# Patient Record
Sex: FEMALE | Race: BLACK OR AFRICAN AMERICAN | NOT HISPANIC OR LATINO | URBAN - METROPOLITAN AREA
[De-identification: names, ages, dates, MRNs, and addresses within clinical notes are randomized per-mention and may not be internally consistent; named-entity substitution may affect disease eponyms.]

---

## 2021-08-25 ENCOUNTER — EMERGENCY (EMERGENCY)
Facility: HOSPITAL | Age: 21
LOS: 1 days | Discharge: HOME | End: 2021-08-25
Attending: STUDENT IN AN ORGANIZED HEALTH CARE EDUCATION/TRAINING PROGRAM
Payer: COMMERCIAL

## 2021-08-25 VITALS
TEMPERATURE: 98 F | OXYGEN SATURATION: 96 % | WEIGHT: 205.03 LBS | HEIGHT: 65 IN | DIASTOLIC BLOOD PRESSURE: 82 MMHG | RESPIRATION RATE: 16 BRPM | HEART RATE: 73 BPM | SYSTOLIC BLOOD PRESSURE: 117 MMHG

## 2021-08-25 PROCEDURE — 99283 EMERGENCY DEPT VISIT LOW MDM: CPT

## 2021-08-25 RX ORDER — AZITHROMYCIN 500 MG/1
1 TABLET, FILM COATED ORAL
Qty: 3 | Refills: 0
Start: 2021-08-25 | End: 2021-08-27

## 2021-08-25 NOTE — ED ADULT TRIAGE NOTE - CHIEF COMPLAINT QUOTE
Patient ambulated into ED with steady gait, Pt c/o having diarrhea since returning from [place], and having abd pain

## 2021-08-25 NOTE — ED PROVIDER NOTE - ATTENDING CONTRIBUTION TO CARE
20 y/o f, denies PMH, presents to ED c/o diarrhea. Mostly after eating. Started 5 days ago after travel to [place]. Friend with similar symptoms. Denies fever, chills, vomiting, cp, sob, abd pain. dysuria, BRBPR.   AP - appears well hydrated, abd soft nontender. supportive care for possible travelers diarrhea. strict return precautions discussed

## 2021-08-25 NOTE — ED PROVIDER NOTE - CLINICAL SUMMARY MEDICAL DECISION MAKING FREE TEXT BOX
Pt with loose bowel movements for 5 days after returning from the [place]. Afebrile since onset of symptoms. Abd exam benign. Advised to increase liquid intake, azithromycin sent to pharmacy and advised pt to take if symptoms are persistent. Advised to return to ED for any new or worsening symptoms.

## 2021-08-25 NOTE — ED PROVIDER NOTE - OBJECTIVE STATEMENT
Pt Pt is a 22 y/o f, denies PMH, presents to ED c/o diarrhea. Pt reports 5 days of diarrhea after returning from travel to [place]. Pt reports "loose bowl movements" after eating meals since her return. Pt states she traveled with a friend who is also experiencing similar symptoms. Pt has been afebrile since onset of symptoms. Pt has had no episodes of vomiting. No other complaints at this time. Denies fevers, chills, SOB, chest pain, back pain, nausea, vomiting, HA, dizziness.

## 2021-08-25 NOTE — ED PROVIDER NOTE - PATIENT PORTAL LINK FT
You can access the FollowMyHealth Patient Portal offered by [facility] by registering at the following website: [email or web address]. By joining [employer]’s FollowMyHealth portal, you will also be able to view your health information using other applications (apps) compatible with our system.

## 2024-05-20 ENCOUNTER — EMERGENCY (EMERGENCY)
Facility: HOSPITAL | Age: 24
LOS: 1 days | Discharge: HOME | End: 2024-05-20
Attending: EMERGENCY MEDICINE
Payer: COMMERCIAL

## 2024-05-20 VITALS
TEMPERATURE: 99 F | OXYGEN SATURATION: 100 % | DIASTOLIC BLOOD PRESSURE: 82 MMHG | HEART RATE: 67 BPM | SYSTOLIC BLOOD PRESSURE: 121 MMHG | RESPIRATION RATE: 16 BRPM

## 2024-05-20 LAB
ALBUMIN SERPL ELPH-MCNC: 4.2 G/DL — SIGNIFICANT CHANGE UP (ref 3.3–5.2)
ALP SERPL-CCNC: 66 U/L — SIGNIFICANT CHANGE UP (ref 40–120)
ALT FLD-CCNC: 20 U/L — SIGNIFICANT CHANGE UP
ANION GAP SERPL CALC-SCNC: 16 MMOL/L — SIGNIFICANT CHANGE UP (ref 5–17)
AST SERPL-CCNC: 37 U/L — HIGH
BASOPHILS # BLD AUTO: 0.04 K/UL — SIGNIFICANT CHANGE UP (ref 0–0.2)
BASOPHILS NFR BLD AUTO: 0.7 % — SIGNIFICANT CHANGE UP (ref 0–2)
BILIRUB SERPL-MCNC: 0.3 MG/DL — LOW (ref 0.4–2)
BUN SERPL-MCNC: 10.7 MG/DL — SIGNIFICANT CHANGE UP (ref 8–20)
CALCIUM SERPL-MCNC: 9.3 MG/DL — SIGNIFICANT CHANGE UP (ref 8.4–10.5)
CHLORIDE SERPL-SCNC: 101 MMOL/L — SIGNIFICANT CHANGE UP (ref 96–108)
CK MB CFR SERPL CALC: 8.9 NG/ML — HIGH (ref 0–6.7)
CK SERPL-CCNC: 666 U/L — HIGH (ref 25–170)
CO2 SERPL-SCNC: 22 MMOL/L — SIGNIFICANT CHANGE UP (ref 22–29)
CREAT SERPL-MCNC: 0.79 MG/DL — SIGNIFICANT CHANGE UP (ref 0.5–1.3)
EGFR: 106 ML/MIN/1.73M2 — SIGNIFICANT CHANGE UP
EOSINOPHIL # BLD AUTO: 0.02 K/UL — SIGNIFICANT CHANGE UP (ref 0–0.5)
EOSINOPHIL NFR BLD AUTO: 0.3 % — SIGNIFICANT CHANGE UP (ref 0–6)
GLUCOSE SERPL-MCNC: 106 MG/DL — HIGH (ref 70–99)
HCG SERPL-ACNC: <4 MIU/ML — SIGNIFICANT CHANGE UP
HCT VFR BLD CALC: 39.5 % — SIGNIFICANT CHANGE UP (ref 34.5–45)
HGB BLD-MCNC: 12.7 G/DL — SIGNIFICANT CHANGE UP (ref 11.5–15.5)
IMM GRANULOCYTES NFR BLD AUTO: 0.2 % — SIGNIFICANT CHANGE UP (ref 0–0.9)
LYMPHOCYTES # BLD AUTO: 1.89 K/UL — SIGNIFICANT CHANGE UP (ref 1–3.3)
LYMPHOCYTES # BLD AUTO: 32.1 % — SIGNIFICANT CHANGE UP (ref 13–44)
MAGNESIUM SERPL-MCNC: 1.7 MG/DL — SIGNIFICANT CHANGE UP (ref 1.6–2.6)
MCHC RBC-ENTMCNC: 26.5 PG — LOW (ref 27–34)
MCHC RBC-ENTMCNC: 32.2 GM/DL — SIGNIFICANT CHANGE UP (ref 32–36)
MCV RBC AUTO: 82.5 FL — SIGNIFICANT CHANGE UP (ref 80–100)
MONOCYTES # BLD AUTO: 0.41 K/UL — SIGNIFICANT CHANGE UP (ref 0–0.9)
MONOCYTES NFR BLD AUTO: 7 % — SIGNIFICANT CHANGE UP (ref 2–14)
NEUTROPHILS # BLD AUTO: 3.51 K/UL — SIGNIFICANT CHANGE UP (ref 1.8–7.4)
NEUTROPHILS NFR BLD AUTO: 59.7 % — SIGNIFICANT CHANGE UP (ref 43–77)
PLATELET # BLD AUTO: 293 K/UL — SIGNIFICANT CHANGE UP (ref 150–400)
POTASSIUM SERPL-MCNC: 4.3 MMOL/L — SIGNIFICANT CHANGE UP (ref 3.5–5.3)
POTASSIUM SERPL-SCNC: 4.3 MMOL/L — SIGNIFICANT CHANGE UP (ref 3.5–5.3)
PROT SERPL-MCNC: 8.1 G/DL — SIGNIFICANT CHANGE UP (ref 6.6–8.7)
RBC # BLD: 4.79 M/UL — SIGNIFICANT CHANGE UP (ref 3.8–5.2)
RBC # FLD: 14.1 % — SIGNIFICANT CHANGE UP (ref 10.3–14.5)
SODIUM SERPL-SCNC: 139 MMOL/L — SIGNIFICANT CHANGE UP (ref 135–145)
WBC # BLD: 5.88 K/UL — SIGNIFICANT CHANGE UP (ref 3.8–10.5)
WBC # FLD AUTO: 5.88 K/UL — SIGNIFICANT CHANGE UP (ref 3.8–10.5)

## 2024-05-20 PROCEDURE — 82553 CREATINE MB FRACTION: CPT

## 2024-05-20 PROCEDURE — 80053 COMPREHEN METABOLIC PANEL: CPT

## 2024-05-20 PROCEDURE — 99284 EMERGENCY DEPT VISIT MOD MDM: CPT | Mod: 25

## 2024-05-20 PROCEDURE — 93010 ELECTROCARDIOGRAM REPORT: CPT

## 2024-05-20 PROCEDURE — 36415 COLL VENOUS BLD VENIPUNCTURE: CPT

## 2024-05-20 PROCEDURE — 83735 ASSAY OF MAGNESIUM: CPT

## 2024-05-20 PROCEDURE — 82550 ASSAY OF CK (CPK): CPT

## 2024-05-20 PROCEDURE — 99284 EMERGENCY DEPT VISIT MOD MDM: CPT

## 2024-05-20 PROCEDURE — 85025 COMPLETE CBC W/AUTO DIFF WBC: CPT

## 2024-05-20 PROCEDURE — 84702 CHORIONIC GONADOTROPIN TEST: CPT

## 2024-05-20 PROCEDURE — 93005 ELECTROCARDIOGRAM TRACING: CPT

## 2024-05-20 RX ORDER — SODIUM CHLORIDE 9 MG/ML
1000 INJECTION INTRAMUSCULAR; INTRAVENOUS; SUBCUTANEOUS ONCE
Refills: 0 | Status: COMPLETED | OUTPATIENT
Start: 2024-05-20 | End: 2024-05-20

## 2024-05-20 RX ORDER — ACETAMINOPHEN 500 MG
1000 TABLET ORAL ONCE
Refills: 0 | Status: DISCONTINUED | OUTPATIENT
Start: 2024-05-20 | End: 2024-05-28

## 2024-05-20 RX ADMIN — SODIUM CHLORIDE 1000 MILLILITER(S): 9 INJECTION INTRAMUSCULAR; INTRAVENOUS; SUBCUTANEOUS at 22:51

## 2024-05-20 RX ADMIN — SODIUM CHLORIDE 1000 MILLILITER(S): 9 INJECTION INTRAMUSCULAR; INTRAVENOUS; SUBCUTANEOUS at 19:33

## 2024-05-20 NOTE — ED PROVIDER NOTE - NSFOLLOWUPINSTRUCTIONS_ED_ALL_ED_FT
Rhabdomyolysis  Rhabdomyolysis is a condition that happens when muscle cells break down and release substances into the blood that can damage the kidneys. This condition happens because of damage to the muscles that move bones (skeletal muscles). When the skeletal muscles are damaged, substances inside the muscle cells go into the blood. One of these substances is a protein called myoglobin.    Large amounts of myoglobin can cause kidney damage or kidney failure. Other substances that are released by muscle cells may upset the balance of the minerals (electrolytes) in the blood. This imbalance causes the blood to have too much acid (acidosis).    What are the causes?  This condition is caused by muscle damage. There are common causes and possible causes.    Common causes of the condition    Common causes of muscle damage include:  Using the muscles too much.  Getting an injury that crushes or squeezes a muscle too tightly.  Using drugs, mainly cocaine.  Drinking too much alcohol.  Possible causes of the condition    Other possible causes of this condition include:  Some prescription medicines, such as:  Medicines that lower cholesterol (statins).  Amphetamines, which are used to treat attention deficit hyperactivity disorder (ADHD) or to help with weight loss.  Certain pain medicines (opiates).  Infections.  Muscle diseases that are passed down from parent to child (inherited).  High fever.  Heatstroke.  Dehydration.  Seizures.  Surgery.  What increases the risk?  The following factors may make you more likely to develop this condition:  A family history of muscle disease.  Taking part in extreme sports, such as running in marathons.  Diabetes.  Being an older adult.  Heavy drug or alcohol use.  What are the signs or symptoms?  A person can have the symptoms of rhabdomyolysis or complications that come from the symptoms.    Symptoms of the disease    Symptoms of this condition vary. Some people have very few symptoms and other people have many symptoms.    Common symptoms include:  Muscle pain and swelling.  Weak muscles.  Dark urine.  Feeling weak and tired.  Other symptoms include:  Nausea and vomiting.  A fever.  Pain in the abdomen.  Pain in the joints.  Complications from the disease    Complications from this condition include:  A heart rhythm that is not normal (arrhythmia).  Seizures.  Not urinating enough because of kidney failure.  Very low blood pressure (hypotension) and shock. Signs of shock include dizziness, blurry vision, and clammy skin.  Bleeding that is hard to stop or control.  Compartment syndrome. This is when there is too much pressure in the space surrounding muscles.  How is this diagnosed?  This condition is diagnosed based on:  Your symptoms and medical history.  A physical exam.  Tests, including:  Blood tests.  Urine tests to check for myoglobin.  You may also have other tests to check for causes of muscle damage and to check for complications.    How is this treated?    This condition may be treated with:  Fluids and medicines given through an IV that is inserted into one of your veins.  Medicines to lower acidosis or to restore the balance of electrolytes in your blood.  Hemodialysis. This treatment uses an artificial kidney machine to filter your blood while you recover. You may have this if other treatments are not helping.  Follow these instructions at home:  Activity    Rest at home as told by your health care provider.  Return to your normal activities as told by your health care provider. Ask your health care provider what activities are safe for you.  Do not do activities that take a lot of effort. Ask your health care provider what level of exercise is safe for you.  Alcohol use    Do not drink alcohol if:  Your health care provider tells you not to drink.  You are pregnant, may be pregnant, or are planning to become pregnant.  If you drink alcohol:  Limit how much you use to:  0–1 drink a day for women.  0–2 drinks a day for men.  Be aware of how much alcohol is in your drink. In the [place] one drink equals one 12 oz bottle of beer (355 mL), one 5 oz glass of wine (148 mL), or one 1½ oz glass of hard liquor (44 mL).  General instructions      Take over-the-counter and prescription medicines only as told by your health care provider.  Drink enough fluid to keep your urine pale yellow.  Do not use drugs.  If you are having problems with drug or alcohol use, ask your health care provider for help.  Keep all follow-up visits as told by your health care provider. This is important.  Contact a health care provider if:  You start having symptoms of this condition after treatment.  Get help right away if you:  Have a seizure.  Bleed easily or cannot control bleeding.  Cannot urinate.  Have chest pain.  Have trouble breathing.  These symptoms may represent a serious problem that is an emergency. Do not wait to see if the symptoms will go away. Get medical help right away. Call your local emergency services (911 in the [place] Do not drive yourself to the hospital.    Summary  Rhabdomyolysis is a condition that happens when muscle cells break down and release substances into the blood that can damage the kidneys.  This condition happens because of damage to the muscles that move bones (skeletal muscle). It can also be caused by too much alcohol and drug use.  Treatment may include fluids, medicines, and helping the kidneys filter blood (hemodialysis).  Contact your health care provider if you start having symptoms of this condition after treatment.  Get help right away if you have a seizure, chest pain, or trouble breathing, or if you bleed easily, cannot control bleeding, or cannot urinate.  This information is not intended to replace advice given to you by your health care provider. Make sure you discuss any questions you have with your health care provider.

## 2024-05-20 NOTE — ED PROVIDER NOTE - OBJECTIVE STATEMENT
Patient is a 25 yo female with no PMHx presenting with syncope. Patient and mother at bedside states patient was lifting weights today when her vision went dark and patient passed out. Denies headstrike, blood thinners. Patient has been working out a lot more than usual and has had paresthesias to bilateral LE. Patient endorsing a dull HA currently. Denies any PMHx, allergies, surgeries, smoking, drug use. Patient aaox3, no FND, moving all extremities equally. No signs of trauma.

## 2024-05-20 NOTE — ED PROVIDER NOTE - PROGRESS NOTE DETAILS
JK - labs showing mild rhabdo; vss resting comfortably well appearing. Ambulating on her own tolerating PO. Ready for DC.

## 2024-05-20 NOTE — ED ADULT NURSE NOTE - OBJECTIVE STATEMENT
pt AOx4, breathing even and unlabored. assumed care 1846. pt arrives to ED w/mom c/o lightheadedness, blurry vision, elevated BP today. pt states symptoms started while lifting weights at the gym. pt states she's been on fluid cleanse fasting last 2 days. denies LOC. pending lab results.

## 2024-05-20 NOTE — ED PROVIDER NOTE - PATIENT PORTAL LINK FT
You can access the FollowMyHealth Patient Portal offered by [facility] by registering at the following website: [email or web address]. By joining [employer]’s FollowMyHealth portal, you will also be able to view your health information using other applications (apps) compatible with our system.

## 2024-05-20 NOTE — ED PROVIDER NOTE - CLINICAL SUMMARY MEDICAL DECISION MAKING FREE TEXT BOX
Patient is a 25 yo female with no PMHx presenting with syncope. VSS, well appearing, PERRLA, EOMI, CN 2-12 intact, no FND.    Will check labs r.o electrolyte abnormalities and rhabdo, treat likely tension HA, ekg to r.o arrythmia, hydrate, reassess.

## 2024-05-20 NOTE — ED PROVIDER NOTE - ATTENDING CONTRIBUTION TO CARE
Patient is a 25 yo female with no PMHx presenting with syncope. Patient and mother at bedside states patient was lifting weights today when her vision went dark and patient passed out. Denies headstrike, blood thinners. Patient has been working out a lot more than usual and has had paresthesias to bilateral LE. Patient endorsing a dull HA currently. Denies any PMHx, allergies, surgeries, smoking, drug use. Patient aaox3, no FND, moving all extremities equally. No signs of trauma.    In ED patient feeling back to baseline.  Normal neurologic examination.  Pulses equal bilaterally.  Will obtain screening labs, hCG, CTA given weight lifting, EKG, feet and reevaluate.

## 2024-05-20 NOTE — ED ADULT TRIAGE NOTE - GLASGOW COMA SCALE: BEST VERBAL RESPONSE, MLM
11/04/19 1400   Group 4   Start Time 1400   Stop Time 1440   Length (min) 40 min   Group Name Therapeutic Activity   Focus of Group Art work project   Attendance Present   Mood Bright   Affect Calm   Behavior/Socialization Engaged   Thought Process Tracking   Patient Response Interactive   Task Performance Follows directions   Group Notes Pt engaged in completing art work project during group time.  Pt engaged in conversation with group peers.   Manju Ho LCSW, SAC   (V5) oriented

## 2024-05-21 VITALS
DIASTOLIC BLOOD PRESSURE: 82 MMHG | TEMPERATURE: 98 F | SYSTOLIC BLOOD PRESSURE: 134 MMHG | RESPIRATION RATE: 18 BRPM | OXYGEN SATURATION: 100 % | HEART RATE: 67 BPM

## 2024-06-07 ENCOUNTER — TRANSCRIPTION ENCOUNTER (OUTPATIENT)
Age: 24
End: 2024-06-07

## 2024-06-13 PROBLEM — Z00.00 ENCOUNTER FOR PREVENTIVE HEALTH EXAMINATION: Status: ACTIVE | Noted: 2024-06-13

## 2024-06-26 ENCOUNTER — NON-APPOINTMENT (OUTPATIENT)
Age: 24
End: 2024-06-26

## 2024-06-26 ENCOUNTER — APPOINTMENT (OUTPATIENT)
Dept: CARDIOLOGY | Facility: CLINIC | Age: 24
End: 2024-06-26

## 2024-06-26 ENCOUNTER — APPOINTMENT (OUTPATIENT)
Dept: ELECTROPHYSIOLOGY | Facility: CLINIC | Age: 24
End: 2024-06-26

## 2024-06-26 VITALS
SYSTOLIC BLOOD PRESSURE: 120 MMHG | HEIGHT: 67 IN | OXYGEN SATURATION: 100 % | HEART RATE: 66 BPM | BODY MASS INDEX: 29.03 KG/M2 | DIASTOLIC BLOOD PRESSURE: 84 MMHG | WEIGHT: 185 LBS

## 2024-06-26 DIAGNOSIS — Z78.9 OTHER SPECIFIED HEALTH STATUS: ICD-10-CM

## 2024-06-26 DIAGNOSIS — R55 DIZZINESS AND GIDDINESS: ICD-10-CM

## 2024-06-26 DIAGNOSIS — R42 DIZZINESS AND GIDDINESS: ICD-10-CM

## 2024-06-26 DIAGNOSIS — Z80.42 FAMILY HISTORY OF MALIGNANT NEOPLASM OF PROSTATE: ICD-10-CM

## 2024-06-26 DIAGNOSIS — Z82.49 FAMILY HISTORY OF ISCHEMIC HEART DISEASE AND OTHER DISEASES OF THE CIRCULATORY SYSTEM: ICD-10-CM

## 2024-06-26 PROCEDURE — 99204 OFFICE O/P NEW MOD 45 MIN: CPT

## 2024-06-26 PROCEDURE — 93000 ELECTROCARDIOGRAM COMPLETE: CPT

## 2024-06-26 PROCEDURE — G2211 COMPLEX E/M VISIT ADD ON: CPT

## 2024-07-19 PROCEDURE — 93248 EXT ECG>7D<15D REV&INTERPJ: CPT

## 2024-07-24 ENCOUNTER — APPOINTMENT (OUTPATIENT)
Dept: OTOLARYNGOLOGY | Facility: CLINIC | Age: 24
End: 2024-07-24
Payer: COMMERCIAL

## 2024-07-24 ENCOUNTER — APPOINTMENT (OUTPATIENT)
Dept: CARDIOLOGY | Facility: CLINIC | Age: 24
End: 2024-07-24

## 2024-07-24 VITALS
DIASTOLIC BLOOD PRESSURE: 80 MMHG | OXYGEN SATURATION: 100 % | BODY MASS INDEX: 29.82 KG/M2 | SYSTOLIC BLOOD PRESSURE: 115 MMHG | HEART RATE: 74 BPM | HEIGHT: 67 IN | WEIGHT: 190 LBS

## 2024-07-24 VITALS — WEIGHT: 190 LBS | BODY MASS INDEX: 29.82 KG/M2 | HEIGHT: 67 IN

## 2024-07-24 DIAGNOSIS — H60.543 ACUTE ECZEMATOID OTITIS EXTERNA, BILATERAL: ICD-10-CM

## 2024-07-24 PROCEDURE — 92557 COMPREHENSIVE HEARING TEST: CPT

## 2024-07-24 PROCEDURE — 99203 OFFICE O/P NEW LOW 30 MIN: CPT

## 2024-07-24 PROCEDURE — 92567 TYMPANOMETRY: CPT

## 2024-07-24 PROCEDURE — 93000 ELECTROCARDIOGRAM COMPLETE: CPT

## 2024-07-24 PROCEDURE — G2211 COMPLEX E/M VISIT ADD ON: CPT

## 2024-07-24 PROCEDURE — 99214 OFFICE O/P EST MOD 30 MIN: CPT

## 2024-07-24 RX ORDER — MOMETASONE FUROATE 1 MG/G
0.1 OINTMENT TOPICAL
Qty: 1 | Refills: 2 | Status: ACTIVE | COMMUNITY
Start: 2024-07-24 | End: 1900-01-01

## 2024-07-24 NOTE — REVIEW OF SYSTEMS
[Ear Itch] : ear itch [Patient Intake Form Reviewed] : Patient intake form was reviewed [Negative] : Ear [de-identified] : throbbing

## 2024-09-02 PROBLEM — R00.2 HEART PALPITATIONS: Status: ACTIVE | Noted: 2024-09-02

## 2024-09-11 ENCOUNTER — APPOINTMENT (OUTPATIENT)
Dept: OBGYN | Facility: CLINIC | Age: 24
End: 2024-09-11
Payer: COMMERCIAL

## 2024-09-11 ENCOUNTER — NON-APPOINTMENT (OUTPATIENT)
Age: 24
End: 2024-09-11

## 2024-09-11 VITALS
BODY MASS INDEX: 29.03 KG/M2 | WEIGHT: 185 LBS | SYSTOLIC BLOOD PRESSURE: 131 MMHG | HEART RATE: 71 BPM | HEIGHT: 67 IN | DIASTOLIC BLOOD PRESSURE: 82 MMHG

## 2024-09-11 DIAGNOSIS — Z11.3 ENCOUNTER FOR SCREENING FOR INFECTIONS WITH A PREDOMINANTLY SEXUAL MODE OF TRANSMISSION: ICD-10-CM

## 2024-09-11 DIAGNOSIS — Z01.419 ENCOUNTER FOR GYNECOLOGICAL EXAMINATION (GENERAL) (ROUTINE) W/OUT ABNORMAL FINDINGS: ICD-10-CM

## 2024-09-11 DIAGNOSIS — Z12.4 ENCOUNTER FOR SCREENING FOR MALIGNANT NEOPLASM OF CERVIX: ICD-10-CM

## 2024-09-11 DIAGNOSIS — Z00.00 ENCOUNTER FOR GENERAL ADULT MEDICAL EXAMINATION W/OUT ABNORMAL FINDINGS: ICD-10-CM

## 2024-09-11 LAB
BILIRUB UR QL STRIP: NEGATIVE
CLARITY UR: CLEAR
COLLECTION METHOD: NORMAL
GLUCOSE UR-MCNC: NEGATIVE
HCG UR QL: 0 EU/DL
HEMOGLOBIN: 11.7
HGB UR QL STRIP.AUTO: NORMAL
KETONES UR-MCNC: NEGATIVE
LEUKOCYTE ESTERASE UR QL STRIP: NEGATIVE
NITRITE UR QL STRIP: NEGATIVE
PH UR STRIP: 7
PROT UR STRIP-MCNC: NEGATIVE
SP GR UR STRIP: 1

## 2024-09-11 PROCEDURE — 99459 PELVIC EXAMINATION: CPT

## 2024-09-11 PROCEDURE — 99385 PREV VISIT NEW AGE 18-39: CPT

## 2024-09-11 PROCEDURE — 81003 URINALYSIS AUTO W/O SCOPE: CPT | Mod: QW

## 2024-09-11 PROCEDURE — 85018 HEMOGLOBIN: CPT | Mod: QW

## 2024-09-12 LAB
C TRACH RRNA SPEC QL NAA+PROBE: NOT DETECTED
N GONORRHOEA RRNA SPEC QL NAA+PROBE: NOT DETECTED
SOURCE AMPLIFICATION: NORMAL

## 2024-09-13 NOTE — HISTORY OF PRESENT ILLNESS
[FreeTextEntry1] : Patient is a 23 yo  celibate female here today for initial visit for her annual visit. She denies any GYN complaints at this time. 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 
[FreeTextEntry1] : Patient is a 25 yo  celibate female here today for initial visit for her annual visit. She denies any GYN complaints at this time. 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 
n/a

## 2024-09-13 NOTE — PLAN
[FreeTextEntry1] : Patient to follow up in 1 year for annual GYN exam GC ordered Pap ordered All questions answered, patient agreeable with plan  I Caren Landry [facility] am scribing for the presence of Dr. Wise the following sections HISTORY OF PRESENT ILLNESS, PAST MEDICAL/FAMILY/SOCIAL HISTORY; REVIEW OF SYSTEMS; VITAL SIGNS; PHYSICAL EXAM; DISPOSITION. I personally performed the services described in the documentation, reviewed the documentation recorded by the scribe in my presence and it accurately and completely records my words and actions.

## 2024-09-13 NOTE — PHYSICAL EXAM
[Chaperone Present] : A chaperone was present in the examining room during all aspects of the physical examination [Appropriately responsive] : appropriately responsive [Alert] : alert [No Acute Distress] : no acute distress [No Lymphadenopathy] : no lymphadenopathy [Soft] : soft [Non-tender] : non-tender [Non-distended] : non-distended [No HSM] : No HSM [No Lesions] : no lesions [No Mass] : no mass [Oriented x3] : oriented x3 [Examination Of The Breasts] : a normal appearance [No Masses] : no breast masses were palpable [Labia Majora] : normal [Labia Minora] : normal [Normal] : normal [Uterine Adnexae] : normal [FreeTextEntry2] : Judit FNP-BC

## 2024-09-13 NOTE — PLAN
[FreeTextEntry1] : Patient to follow up in 1 year for annual GYN exam GC ordered Pap ordered All questions answered, patient agreeable with plan  I Caren Landry [facility] am scribing for the presence of Dr. Wise the following sections HISTORY OF PRESENT ILLNESS, PAST MEDICAL/FAMILY/SOCIAL HISTORY; REVIEW OF SYSTEMS; VITAL SIGNS; PHYSICAL EXAM; DISPOSITION. I personally performed the services described in the documentation, reviewed the documentation recorded by the scribe in my presence and it accurately and completely records my words and actions.

## 2024-09-17 LAB — CYTOLOGY CVX/VAG DOC THIN PREP: ABNORMAL

## 2024-09-25 ENCOUNTER — APPOINTMENT (OUTPATIENT)
Dept: CARDIOLOGY | Facility: CLINIC | Age: 24
End: 2024-09-25

## 2024-09-25 PROBLEM — Z12.4 CERVICAL CANCER SCREENING: Status: ACTIVE | Noted: 2024-09-25

## 2024-09-25 PROBLEM — Z12.4 CERVICAL CANCER SCREENING: Status: RESOLVED | Noted: 2024-09-11 | Resolved: 2024-09-25

## 2024-09-25 PROBLEM — Z01.419 ENCOUNTER FOR ROUTINE GYNECOLOGICAL EXAMINATION: Status: ACTIVE | Noted: 2024-09-25

## 2024-09-25 PROBLEM — Z11.3 SCREEN FOR STD (SEXUALLY TRANSMITTED DISEASE): Status: ACTIVE | Noted: 2024-09-25

## 2024-10-01 ENCOUNTER — APPOINTMENT (OUTPATIENT)
Dept: OBGYN | Facility: CLINIC | Age: 24
End: 2024-10-01
Payer: COMMERCIAL

## 2024-10-01 ENCOUNTER — RESULT CHARGE (OUTPATIENT)
Age: 24
End: 2024-10-01

## 2024-10-01 DIAGNOSIS — Z01.419 ENCOUNTER FOR GYNECOLOGICAL EXAMINATION (GENERAL) (ROUTINE) W/OUT ABNORMAL FINDINGS: ICD-10-CM

## 2024-10-01 DIAGNOSIS — Z11.3 ENCOUNTER FOR SCREENING FOR INFECTIONS WITH A PREDOMINANTLY SEXUAL MODE OF TRANSMISSION: ICD-10-CM

## 2024-10-01 DIAGNOSIS — Z12.4 ENCOUNTER FOR SCREENING FOR MALIGNANT NEOPLASM OF CERVIX: ICD-10-CM

## 2024-10-01 DIAGNOSIS — Z00.00 ENCOUNTER FOR GENERAL ADULT MEDICAL EXAMINATION W/OUT ABNORMAL FINDINGS: ICD-10-CM

## 2024-10-01 LAB
BILIRUB UR QL STRIP: NEGATIVE
CLARITY UR: CLEAR
COLLECTION METHOD: NORMAL
GLUCOSE UR-MCNC: NEGATIVE
HCG UR QL: 0 EU/DL
HGB UR QL STRIP.AUTO: NEGATIVE
KETONES UR-MCNC: NEGATIVE
LEUKOCYTE ESTERASE UR QL STRIP: NEGATIVE
NITRITE UR QL STRIP: NEGATIVE
PH UR STRIP: 6
PROT UR STRIP-MCNC: NEGATIVE
SP GR UR STRIP: 1

## 2024-10-01 PROCEDURE — 81003 URINALYSIS AUTO W/O SCOPE: CPT | Mod: QW

## 2024-10-01 NOTE — PLAN
[FreeTextEntry1] : presents for repeat pap s/s unsatisfactory for eval all questions answered, pt agreeable with plan.

## 2024-10-08 ENCOUNTER — NON-APPOINTMENT (OUTPATIENT)
Age: 24
End: 2024-10-08

## 2024-10-08 LAB — CYTOLOGY CVX/VAG DOC THIN PREP: ABNORMAL

## 2024-10-24 ENCOUNTER — APPOINTMENT (OUTPATIENT)
Dept: GASTROENTEROLOGY | Facility: CLINIC | Age: 24
End: 2024-10-24
Payer: COMMERCIAL

## 2024-10-24 VITALS
WEIGHT: 184 LBS | BODY MASS INDEX: 28.88 KG/M2 | SYSTOLIC BLOOD PRESSURE: 98 MMHG | DIASTOLIC BLOOD PRESSURE: 70 MMHG | HEIGHT: 67 IN

## 2024-10-24 DIAGNOSIS — K59.09 OTHER CONSTIPATION: ICD-10-CM

## 2024-10-24 PROCEDURE — 99204 OFFICE O/P NEW MOD 45 MIN: CPT

## 2024-10-28 ENCOUNTER — APPOINTMENT (OUTPATIENT)
Dept: FAMILY MEDICINE | Facility: CLINIC | Age: 24
End: 2024-10-28
Payer: COMMERCIAL

## 2024-10-28 VITALS
DIASTOLIC BLOOD PRESSURE: 80 MMHG | HEIGHT: 67 IN | HEART RATE: 84 BPM | BODY MASS INDEX: 28.72 KG/M2 | WEIGHT: 183 LBS | OXYGEN SATURATION: 97 % | SYSTOLIC BLOOD PRESSURE: 120 MMHG | TEMPERATURE: 97.8 F

## 2024-10-28 DIAGNOSIS — Z11.3 ENCOUNTER FOR SCREENING FOR INFECTIONS WITH A PREDOMINANTLY SEXUAL MODE OF TRANSMISSION: ICD-10-CM

## 2024-10-28 DIAGNOSIS — Z00.00 ENCOUNTER FOR GENERAL ADULT MEDICAL EXAMINATION W/OUT ABNORMAL FINDINGS: ICD-10-CM

## 2024-10-28 PROBLEM — K59.09 CHRONIC CONSTIPATION: Status: ACTIVE | Noted: 2024-10-24

## 2024-10-28 PROCEDURE — 99385 PREV VISIT NEW AGE 18-39: CPT

## 2024-10-28 RX ORDER — LINACLOTIDE 145 UG/1
145 CAPSULE, GELATIN COATED ORAL
Refills: 0 | Status: ACTIVE | COMMUNITY

## 2024-10-29 DIAGNOSIS — E55.9 VITAMIN D DEFICIENCY, UNSPECIFIED: ICD-10-CM

## 2024-10-29 DIAGNOSIS — Z78.9 OTHER SPECIFIED HEALTH STATUS: ICD-10-CM

## 2024-10-29 LAB
25(OH)D3 SERPL-MCNC: 25.1 NG/ML
ALBUMIN SERPL ELPH-MCNC: 4.3 G/DL
ALP BLD-CCNC: 57 U/L
ALT SERPL-CCNC: 16 U/L
ANION GAP SERPL CALC-SCNC: 12 MMOL/L
AST SERPL-CCNC: 20 U/L
BASOPHILS # BLD AUTO: 0.04 K/UL
BASOPHILS NFR BLD AUTO: 0.8 %
BILIRUB SERPL-MCNC: 0.4 MG/DL
BUN SERPL-MCNC: 9 MG/DL
C TRACH RRNA SPEC QL NAA+PROBE: NOT DETECTED
CALCIUM SERPL-MCNC: 9.7 MG/DL
CHLORIDE SERPL-SCNC: 104 MMOL/L
CHOLEST SERPL-MCNC: 169 MG/DL
CO2 SERPL-SCNC: 23 MMOL/L
CREAT SERPL-MCNC: 0.92 MG/DL
EGFR: 88 ML/MIN/1.73M2
EOSINOPHIL # BLD AUTO: 0.02 K/UL
EOSINOPHIL NFR BLD AUTO: 0.4 %
GLUCOSE SERPL-MCNC: 87 MG/DL
HBV SURFACE AB SERPL IA-ACNC: <3 MIU/ML
HBV SURFACE AG SER QL: NONREACTIVE
HCT VFR BLD CALC: 38.7 %
HCV AB SER QL: NONREACTIVE
HCV S/CO RATIO: 0.08 S/CO
HDLC SERPL-MCNC: 66 MG/DL
HGB BLD-MCNC: 12.5 G/DL
HIV1+2 AB SPEC QL IA.RAPID: NONREACTIVE
HSV 1+2 IGG SER IA-IMP: NEGATIVE
HSV 1+2 IGG SER IA-IMP: POSITIVE
HSV1 IGG SER QL: 27.5 INDEX
HSV2 IGG SER QL: 0.08 INDEX
IMM GRANULOCYTES NFR BLD AUTO: 0 %
LDLC SERPL CALC-MCNC: 94 MG/DL
LYMPHOCYTES # BLD AUTO: 2.89 K/UL
LYMPHOCYTES NFR BLD AUTO: 56.8 %
MAN DIFF?: NORMAL
MCHC RBC-ENTMCNC: 27.7 PG
MCHC RBC-ENTMCNC: 32.3 GM/DL
MCV RBC AUTO: 85.8 FL
MONOCYTES # BLD AUTO: 0.47 K/UL
MONOCYTES NFR BLD AUTO: 9.2 %
N GONORRHOEA RRNA SPEC QL NAA+PROBE: NOT DETECTED
NEUTROPHILS # BLD AUTO: 1.67 K/UL
NEUTROPHILS NFR BLD AUTO: 32.8 %
NONHDLC SERPL-MCNC: 102 MG/DL
PLATELET # BLD AUTO: 246 K/UL
POTASSIUM SERPL-SCNC: 4.5 MMOL/L
PROT SERPL-MCNC: 7.4 G/DL
RBC # BLD: 4.51 M/UL
RBC # FLD: 14.3 %
SODIUM SERPL-SCNC: 139 MMOL/L
SOURCE AMPLIFICATION: NORMAL
T PALLIDUM AB SER QL IA: NEGATIVE
TRIGL SERPL-MCNC: 37 MG/DL
TSH SERPL-ACNC: 0.63 UIU/ML
WBC # FLD AUTO: 5.09 K/UL

## 2024-11-04 DIAGNOSIS — Z23 ENCOUNTER FOR IMMUNIZATION: ICD-10-CM

## 2024-11-05 ENCOUNTER — APPOINTMENT (OUTPATIENT)
Dept: FAMILY MEDICINE | Facility: CLINIC | Age: 24
End: 2024-11-05

## 2024-12-10 ENCOUNTER — APPOINTMENT (OUTPATIENT)
Dept: GASTROENTEROLOGY | Facility: CLINIC | Age: 24
End: 2024-12-10

## 2024-12-17 ENCOUNTER — APPOINTMENT (OUTPATIENT)
Dept: GASTROENTEROLOGY | Facility: CLINIC | Age: 24
End: 2024-12-17
Payer: MEDICARE

## 2024-12-17 ENCOUNTER — NON-APPOINTMENT (OUTPATIENT)
Age: 24
End: 2024-12-17

## 2024-12-17 VITALS
WEIGHT: 188 LBS | DIASTOLIC BLOOD PRESSURE: 74 MMHG | HEIGHT: 67 IN | BODY MASS INDEX: 29.51 KG/M2 | SYSTOLIC BLOOD PRESSURE: 116 MMHG

## 2024-12-17 DIAGNOSIS — K58.1 IRRITABLE BOWEL SYNDROME WITH CONSTIPATION: ICD-10-CM

## 2024-12-17 DIAGNOSIS — R14.0 ABDOMINAL DISTENSION (GASEOUS): ICD-10-CM

## 2024-12-17 DIAGNOSIS — R10.9 UNSPECIFIED ABDOMINAL PAIN: ICD-10-CM

## 2024-12-17 PROCEDURE — 99204 OFFICE O/P NEW MOD 45 MIN: CPT

## 2024-12-20 PROBLEM — K58.1 IRRITABLE BOWEL SYNDROME WITH CONSTIPATION: Status: ACTIVE | Noted: 2024-12-20

## 2025-01-23 ENCOUNTER — APPOINTMENT (OUTPATIENT)
Dept: OBGYN | Facility: CLINIC | Age: 25
End: 2025-01-23
Payer: COMMERCIAL

## 2025-01-23 DIAGNOSIS — N93.9 ABNORMAL UTERINE AND VAGINAL BLEEDING, UNSPECIFIED: ICD-10-CM

## 2025-01-23 LAB
BILIRUB UR QL STRIP: NORMAL
CLARITY UR: CLEAR
COLLECTION METHOD: NORMAL
GLUCOSE UR-MCNC: NORMAL
HCG UR QL: 0.2 EU/DL
HGB UR QL STRIP.AUTO: NORMAL
KETONES UR-MCNC: NORMAL
LEUKOCYTE ESTERASE UR QL STRIP: NORMAL
NITRITE UR QL STRIP: NORMAL
PH UR STRIP: 7
PROT UR STRIP-MCNC: NORMAL
SP GR UR STRIP: 1.01

## 2025-01-23 PROCEDURE — 99213 OFFICE O/P EST LOW 20 MIN: CPT

## 2025-01-23 PROCEDURE — 81003 URINALYSIS AUTO W/O SCOPE: CPT | Mod: QW

## 2025-03-06 ENCOUNTER — APPOINTMENT (OUTPATIENT)
Dept: FAMILY MEDICINE | Facility: CLINIC | Age: 25
End: 2025-03-06
Payer: COMMERCIAL

## 2025-03-06 VITALS
WEIGHT: 188 LBS | HEIGHT: 67 IN | OXYGEN SATURATION: 100 % | DIASTOLIC BLOOD PRESSURE: 78 MMHG | BODY MASS INDEX: 29.51 KG/M2 | HEART RATE: 74 BPM | TEMPERATURE: 98 F | SYSTOLIC BLOOD PRESSURE: 116 MMHG

## 2025-03-06 DIAGNOSIS — Z11.3 ENCOUNTER FOR SCREENING FOR INFECTIONS WITH A PREDOMINANTLY SEXUAL MODE OF TRANSMISSION: ICD-10-CM

## 2025-03-06 DIAGNOSIS — R20.2 PARESTHESIA OF SKIN: ICD-10-CM

## 2025-03-06 DIAGNOSIS — R23.1 PALLOR: ICD-10-CM

## 2025-03-06 PROCEDURE — 99214 OFFICE O/P EST MOD 30 MIN: CPT

## 2025-03-07 DIAGNOSIS — R79.0 ABNORMAL LVL OF BLOOD MINERAL: ICD-10-CM

## 2025-03-07 LAB
ALBUMIN SERPL ELPH-MCNC: 4.5 G/DL
ALP BLD-CCNC: 61 U/L
ALT SERPL-CCNC: 13 U/L
ANION GAP SERPL CALC-SCNC: 11 MMOL/L
AST SERPL-CCNC: 22 U/L
BASOPHILS # BLD AUTO: 0.05 K/UL
BASOPHILS NFR BLD AUTO: 1 %
BILIRUB SERPL-MCNC: 0.4 MG/DL
BUN SERPL-MCNC: 10 MG/DL
CALCIUM SERPL-MCNC: 9.7 MG/DL
CHLORIDE SERPL-SCNC: 101 MMOL/L
CO2 SERPL-SCNC: 26 MMOL/L
CREAT SERPL-MCNC: 0.91 MG/DL
CRP SERPL-MCNC: <3 MG/L
EGFRCR SERPLBLD CKD-EPI 2021: 89 ML/MIN/1.73M2
EOSINOPHIL # BLD AUTO: 0.02 K/UL
EOSINOPHIL NFR BLD AUTO: 0.4 %
ERYTHROCYTE [SEDIMENTATION RATE] IN BLOOD BY WESTERGREN METHOD: 20 MM/HR
ESTIMATED AVERAGE GLUCOSE: 103 MG/DL
FERRITIN SERPL-MCNC: 14 NG/ML
FOLATE SERPL-MCNC: 15 NG/ML
GLUCOSE SERPL-MCNC: 80 MG/DL
HBA1C MFR BLD HPLC: 5.2 %
HCT VFR BLD CALC: 42.3 %
HGB BLD-MCNC: 13.8 G/DL
IMM GRANULOCYTES NFR BLD AUTO: 0.2 %
IRON SATN MFR SERPL: 28 %
IRON SERPL-MCNC: 94 UG/DL
LYMPHOCYTES # BLD AUTO: 2.72 K/UL
LYMPHOCYTES NFR BLD AUTO: 54.6 %
MAN DIFF?: NORMAL
MCHC RBC-ENTMCNC: 29.6 PG
MCHC RBC-ENTMCNC: 32.6 G/DL
MCV RBC AUTO: 90.6 FL
MONOCYTES # BLD AUTO: 0.47 K/UL
MONOCYTES NFR BLD AUTO: 9.4 %
NEUTROPHILS # BLD AUTO: 1.71 K/UL
NEUTROPHILS NFR BLD AUTO: 34.4 %
PLATELET # BLD AUTO: 260 K/UL
POTASSIUM SERPL-SCNC: 4.2 MMOL/L
PROT SERPL-MCNC: 7.7 G/DL
RBC # BLD: 4.67 M/UL
RBC # FLD: 13.1 %
RHEUMATOID FACT SER QL: <10 IU/ML
SODIUM SERPL-SCNC: 138 MMOL/L
T PALLIDUM AB SER QL IA: NEGATIVE
TIBC SERPL-MCNC: 333 UG/DL
UIBC SERPL-MCNC: 238 UG/DL
VIT B12 SERPL-MCNC: 819 PG/ML
WBC # FLD AUTO: 4.98 K/UL

## 2025-03-10 LAB
ANACR T: NEGATIVE
C TRACH RRNA SPEC QL NAA+PROBE: NOT DETECTED
HBV SURFACE AB SERPL IA-ACNC: 19.9 MIU/ML
HBV SURFACE AG SER QL: NONREACTIVE
HCV AB SER QL: NONREACTIVE
HCV S/CO RATIO: 0.08 S/CO
HIV1+2 AB SPEC QL IA.RAPID: NONREACTIVE
HSV 1+2 IGG SER IA-IMP: NEGATIVE
HSV 1+2 IGG SER IA-IMP: POSITIVE
HSV1 IGG SER QL: 33.6 INDEX
HSV2 IGG SER QL: 0.03 INDEX
N GONORRHOEA RRNA SPEC QL NAA+PROBE: NOT DETECTED
SOURCE AMPLIFICATION: NORMAL

## 2025-03-12 ENCOUNTER — APPOINTMENT (OUTPATIENT)
Dept: MRI IMAGING | Facility: CLINIC | Age: 25
End: 2025-03-12

## 2025-03-20 ENCOUNTER — APPOINTMENT (OUTPATIENT)
Dept: BREAST CENTER | Facility: CLINIC | Age: 25
End: 2025-03-20
Payer: COMMERCIAL

## 2025-03-20 VITALS — WEIGHT: 184 LBS | BODY MASS INDEX: 28.88 KG/M2 | HEIGHT: 67 IN

## 2025-03-20 DIAGNOSIS — N63.10 UNSPECIFIED LUMP IN THE RIGHT BREAST, UNSPECIFIED QUADRANT: ICD-10-CM

## 2025-03-20 PROCEDURE — 99204 OFFICE O/P NEW MOD 45 MIN: CPT | Mod: 25

## 2025-03-20 PROCEDURE — 19083 BX BREAST 1ST LESION US IMAG: CPT | Mod: RT

## 2025-03-21 PROBLEM — N63.10 BREAST MASS, RIGHT: Status: ACTIVE | Noted: 2025-03-20

## 2025-03-25 ENCOUNTER — NON-APPOINTMENT (OUTPATIENT)
Age: 25
End: 2025-03-25

## 2025-03-25 LAB — CORE LAB BIOPSY: NORMAL

## 2025-04-10 ENCOUNTER — APPOINTMENT (OUTPATIENT)
Dept: BREAST CENTER | Facility: CLINIC | Age: 25
End: 2025-04-10

## 2025-04-10 PROCEDURE — 99213 OFFICE O/P EST LOW 20 MIN: CPT | Mod: 25

## 2025-04-10 PROCEDURE — 76642 ULTRASOUND BREAST LIMITED: CPT | Mod: 50

## 2025-04-21 PROBLEM — N63.21 MASS OF UPPER OUTER QUADRANT OF LEFT BREAST: Status: ACTIVE | Noted: 2025-04-21

## 2025-06-26 ENCOUNTER — APPOINTMENT (OUTPATIENT)
Dept: BREAST CENTER | Facility: CLINIC | Age: 25
End: 2025-06-26

## 2025-06-26 PROCEDURE — 76642 ULTRASOUND BREAST LIMITED: CPT | Mod: 50

## 2025-06-26 PROCEDURE — 99213 OFFICE O/P EST LOW 20 MIN: CPT | Mod: 25

## 2025-08-18 DIAGNOSIS — S89.90XA UNSPECIFIED INJURY OF UNSPECIFIED LOWER LEG, INITIAL ENCOUNTER: ICD-10-CM

## 2025-09-12 ENCOUNTER — EMERGENCY (EMERGENCY)
Facility: HOSPITAL | Age: 25
LOS: 1 days | End: 2025-09-12
Attending: STUDENT IN AN ORGANIZED HEALTH CARE EDUCATION/TRAINING PROGRAM
Payer: COMMERCIAL

## 2025-09-12 ENCOUNTER — APPOINTMENT (OUTPATIENT)
Dept: FAMILY MEDICINE | Facility: CLINIC | Age: 25
End: 2025-09-12

## 2025-09-12 VITALS
DIASTOLIC BLOOD PRESSURE: 80 MMHG | OXYGEN SATURATION: 96 % | RESPIRATION RATE: 18 BRPM | WEIGHT: 184.97 LBS | SYSTOLIC BLOOD PRESSURE: 133 MMHG | HEART RATE: 78 BPM | TEMPERATURE: 98 F | HEIGHT: 67 IN

## 2025-09-12 VITALS
OXYGEN SATURATION: 97 % | SYSTOLIC BLOOD PRESSURE: 111 MMHG | HEART RATE: 80 BPM | DIASTOLIC BLOOD PRESSURE: 70 MMHG | TEMPERATURE: 100 F | RESPIRATION RATE: 18 BRPM

## 2025-09-12 LAB
ALBUMIN SERPL ELPH-MCNC: 4.4 G/DL — SIGNIFICANT CHANGE UP (ref 3.3–5.2)
ALP SERPL-CCNC: 55 U/L — SIGNIFICANT CHANGE UP (ref 40–120)
ALT FLD-CCNC: 22 U/L — SIGNIFICANT CHANGE UP
ANION GAP SERPL CALC-SCNC: 16 MMOL/L — SIGNIFICANT CHANGE UP (ref 5–17)
APTT BLD: 26 SEC — LOW (ref 26.1–36.8)
AST SERPL-CCNC: 31 U/L — SIGNIFICANT CHANGE UP
BASOPHILS # BLD AUTO: 0.04 K/UL — SIGNIFICANT CHANGE UP (ref 0–0.2)
BASOPHILS NFR BLD AUTO: 0.6 % — SIGNIFICANT CHANGE UP (ref 0–2)
BILIRUB SERPL-MCNC: 0.4 MG/DL — SIGNIFICANT CHANGE UP (ref 0.4–2)
BUN SERPL-MCNC: 12.8 MG/DL — SIGNIFICANT CHANGE UP (ref 8–20)
CALCIUM SERPL-MCNC: 9.5 MG/DL — SIGNIFICANT CHANGE UP (ref 8.4–10.5)
CHLORIDE SERPL-SCNC: 100 MMOL/L — SIGNIFICANT CHANGE UP (ref 96–108)
CO2 SERPL-SCNC: 20 MMOL/L — LOW (ref 22–29)
CREAT SERPL-MCNC: 0.88 MG/DL — SIGNIFICANT CHANGE UP (ref 0.5–1.3)
EGFR: 92 ML/MIN/1.73M2 — SIGNIFICANT CHANGE UP
EGFR: 92 ML/MIN/1.73M2 — SIGNIFICANT CHANGE UP
EOSINOPHIL # BLD AUTO: 0.04 K/UL — SIGNIFICANT CHANGE UP (ref 0–0.5)
EOSINOPHIL NFR BLD AUTO: 0.6 % — SIGNIFICANT CHANGE UP (ref 0–6)
GLUCOSE SERPL-MCNC: 108 MG/DL — HIGH (ref 70–99)
HCG SERPL-ACNC: <4 MIU/ML — SIGNIFICANT CHANGE UP
HCT VFR BLD CALC: 38.7 % — SIGNIFICANT CHANGE UP (ref 34.5–45)
HGB BLD-MCNC: 12.9 G/DL — SIGNIFICANT CHANGE UP (ref 11.5–15.5)
IMM GRANULOCYTES # BLD AUTO: 0.01 K/UL — SIGNIFICANT CHANGE UP (ref 0–0.07)
IMM GRANULOCYTES NFR BLD AUTO: 0.1 % — SIGNIFICANT CHANGE UP (ref 0–0.9)
INR BLD: 1.06 RATIO — SIGNIFICANT CHANGE UP (ref 0.85–1.16)
LYMPHOCYTES # BLD AUTO: 2.24 K/UL — SIGNIFICANT CHANGE UP (ref 1–3.3)
LYMPHOCYTES NFR BLD AUTO: 33 % — SIGNIFICANT CHANGE UP (ref 13–44)
MAGNESIUM SERPL-MCNC: 1.3 MG/DL — LOW (ref 1.6–2.6)
MCHC RBC-ENTMCNC: 27.6 PG — SIGNIFICANT CHANGE UP (ref 27–34)
MCHC RBC-ENTMCNC: 33.3 G/DL — SIGNIFICANT CHANGE UP (ref 32–36)
MCV RBC AUTO: 82.7 FL — SIGNIFICANT CHANGE UP (ref 80–100)
MONOCYTES # BLD AUTO: 0.49 K/UL — SIGNIFICANT CHANGE UP (ref 0–0.9)
MONOCYTES NFR BLD AUTO: 7.2 % — SIGNIFICANT CHANGE UP (ref 2–14)
NEUTROPHILS # BLD AUTO: 3.97 K/UL — SIGNIFICANT CHANGE UP (ref 1.8–7.4)
NEUTROPHILS NFR BLD AUTO: 58.5 % — SIGNIFICANT CHANGE UP (ref 43–77)
NRBC # BLD AUTO: 0 K/UL — SIGNIFICANT CHANGE UP (ref 0–0)
NRBC # FLD: 0 K/UL — SIGNIFICANT CHANGE UP (ref 0–0)
NRBC BLD AUTO-RTO: 0 /100 WBCS — SIGNIFICANT CHANGE UP (ref 0–0)
PLATELET # BLD AUTO: 292 K/UL — SIGNIFICANT CHANGE UP (ref 150–400)
PMV BLD: 10.2 FL — SIGNIFICANT CHANGE UP (ref 7–13)
POTASSIUM SERPL-MCNC: 3.9 MMOL/L — SIGNIFICANT CHANGE UP (ref 3.5–5.3)
POTASSIUM SERPL-SCNC: 3.9 MMOL/L — SIGNIFICANT CHANGE UP (ref 3.5–5.3)
PROT SERPL-MCNC: 7.8 G/DL — SIGNIFICANT CHANGE UP (ref 6.6–8.7)
PROTHROM AB SERPL-ACNC: 12.3 SEC — SIGNIFICANT CHANGE UP (ref 9.9–13.4)
RBC # BLD: 4.68 M/UL — SIGNIFICANT CHANGE UP (ref 3.8–5.2)
RBC # FLD: 13.2 % — SIGNIFICANT CHANGE UP (ref 10.3–14.5)
SODIUM SERPL-SCNC: 136 MMOL/L — SIGNIFICANT CHANGE UP (ref 135–145)
WBC # BLD: 6.79 K/UL — SIGNIFICANT CHANGE UP (ref 3.8–10.5)
WBC # FLD AUTO: 6.79 K/UL — SIGNIFICANT CHANGE UP (ref 3.8–10.5)

## 2025-09-12 PROCEDURE — 99285 EMERGENCY DEPT VISIT HI MDM: CPT

## 2025-09-12 PROCEDURE — 83735 ASSAY OF MAGNESIUM: CPT

## 2025-09-12 PROCEDURE — 85025 COMPLETE CBC W/AUTO DIFF WBC: CPT

## 2025-09-12 PROCEDURE — 84702 CHORIONIC GONADOTROPIN TEST: CPT

## 2025-09-12 PROCEDURE — 99284 EMERGENCY DEPT VISIT MOD MDM: CPT | Mod: 25

## 2025-09-12 PROCEDURE — 70450 CT HEAD/BRAIN W/O DYE: CPT | Mod: 26,59

## 2025-09-12 PROCEDURE — 70496 CT ANGIOGRAPHY HEAD: CPT

## 2025-09-12 PROCEDURE — 80053 COMPREHEN METABOLIC PANEL: CPT

## 2025-09-12 PROCEDURE — 85610 PROTHROMBIN TIME: CPT

## 2025-09-12 PROCEDURE — 36415 COLL VENOUS BLD VENIPUNCTURE: CPT

## 2025-09-12 PROCEDURE — 96374 THER/PROPH/DIAG INJ IV PUSH: CPT | Mod: XU

## 2025-09-12 PROCEDURE — 70498 CT ANGIOGRAPHY NECK: CPT | Mod: 26

## 2025-09-12 PROCEDURE — 70496 CT ANGIOGRAPHY HEAD: CPT | Mod: 26

## 2025-09-12 PROCEDURE — 96375 TX/PRO/DX INJ NEW DRUG ADDON: CPT | Mod: XU

## 2025-09-12 PROCEDURE — 70498 CT ANGIOGRAPHY NECK: CPT

## 2025-09-12 PROCEDURE — 85730 THROMBOPLASTIN TIME PARTIAL: CPT

## 2025-09-12 PROCEDURE — 70450 CT HEAD/BRAIN W/O DYE: CPT

## 2025-09-12 RX ORDER — ONDANSETRON HCL/PF 4 MG/2 ML
4 VIAL (ML) INJECTION ONCE
Refills: 0 | Status: COMPLETED | OUTPATIENT
Start: 2025-09-12 | End: 2025-09-12

## 2025-09-12 RX ORDER — SUMATRIPTAN 100 MG/1
50 TABLET, FILM COATED ORAL ONCE
Refills: 0 | Status: COMPLETED | OUTPATIENT
Start: 2025-09-12 | End: 2025-09-12

## 2025-09-12 RX ORDER — KETOROLAC TROMETHAMINE 30 MG/ML
15 INJECTION, SOLUTION INTRAMUSCULAR; INTRAVENOUS ONCE
Refills: 0 | Status: DISCONTINUED | OUTPATIENT
Start: 2025-09-12 | End: 2025-09-12

## 2025-09-12 RX ORDER — MAGNESIUM SULFATE 500 MG/ML
2 SYRINGE (ML) INJECTION ONCE
Refills: 0 | Status: COMPLETED | OUTPATIENT
Start: 2025-09-12 | End: 2025-09-12

## 2025-09-12 RX ORDER — MAGNESIUM SULFATE 500 MG/ML
2 SYRINGE (ML) INJECTION ONCE
Refills: 0 | Status: DISCONTINUED | OUTPATIENT
Start: 2025-09-12 | End: 2025-09-12

## 2025-09-12 RX ORDER — METOCLOPRAMIDE HCL 10 MG
10 TABLET ORAL ONCE
Refills: 0 | Status: COMPLETED | OUTPATIENT
Start: 2025-09-12 | End: 2025-09-12

## 2025-09-12 RX ORDER — ACETAMINOPHEN 500 MG/5ML
1000 LIQUID (ML) ORAL ONCE
Refills: 0 | Status: COMPLETED | OUTPATIENT
Start: 2025-09-12 | End: 2025-09-12

## 2025-09-12 RX ADMIN — Medication 400 MILLIGRAM(S): at 11:54

## 2025-09-12 RX ADMIN — Medication 1000 MILLILITER(S): at 11:54

## 2025-09-12 RX ADMIN — SUMATRIPTAN 50 MILLIGRAM(S): 100 TABLET, FILM COATED ORAL at 12:13

## 2025-09-12 RX ADMIN — Medication 50 GRAM(S): at 13:15

## 2025-09-12 RX ADMIN — KETOROLAC TROMETHAMINE 15 MILLIGRAM(S): 30 INJECTION, SOLUTION INTRAMUSCULAR; INTRAVENOUS at 12:02

## 2025-09-12 RX ADMIN — Medication 1000 MILLIGRAM(S): at 13:30

## 2025-09-12 RX ADMIN — Medication 10 MILLIGRAM(S): at 12:03

## 2025-09-12 RX ADMIN — KETOROLAC TROMETHAMINE 15 MILLIGRAM(S): 30 INJECTION, SOLUTION INTRAMUSCULAR; INTRAVENOUS at 13:30

## 2025-09-15 ENCOUNTER — RESULT CHARGE (OUTPATIENT)
Age: 25
End: 2025-09-15

## 2025-09-15 ENCOUNTER — APPOINTMENT (OUTPATIENT)
Dept: FAMILY MEDICINE | Facility: CLINIC | Age: 25
End: 2025-09-15
Payer: COMMERCIAL

## 2025-09-15 VITALS
HEART RATE: 80 BPM | HEIGHT: 67 IN | BODY MASS INDEX: 28.88 KG/M2 | WEIGHT: 184 LBS | TEMPERATURE: 97.7 F | OXYGEN SATURATION: 96 % | SYSTOLIC BLOOD PRESSURE: 120 MMHG | DIASTOLIC BLOOD PRESSURE: 78 MMHG

## 2025-09-15 DIAGNOSIS — G43.109 MIGRAINE WITH AURA, NOT INTRACTABLE, W/OUT STATUS MIGRAINOSUS: ICD-10-CM

## 2025-09-15 PROBLEM — G43.909 MIGRAINE, UNSPECIFIED, NOT INTRACTABLE, WITHOUT STATUS MIGRAINOSUS: Chronic | Status: ACTIVE | Noted: 2025-09-12

## 2025-09-15 PROBLEM — M62.82 RHABDOMYOLYSIS: Chronic | Status: ACTIVE | Noted: 2025-09-12

## 2025-09-15 LAB
BILIRUB UR QL STRIP: NEGATIVE
CLARITY UR: CLEAR
COLLECTION METHOD: NORMAL
GLUCOSE UR-MCNC: NEGATIVE
HCG UR QL: 0.2 EU/DL
HGB UR QL STRIP.AUTO: NEGATIVE
KETONES UR-MCNC: NEGATIVE
LEUKOCYTE ESTERASE UR QL STRIP: NEGATIVE
NITRITE UR QL STRIP: NEGATIVE
PH UR STRIP: 7
PROT UR STRIP-MCNC: NEGATIVE
SP GR UR STRIP: 1.01

## 2025-09-15 PROCEDURE — 99214 OFFICE O/P EST MOD 30 MIN: CPT

## 2025-09-15 PROCEDURE — G2211 COMPLEX E/M VISIT ADD ON: CPT

## 2025-09-16 DIAGNOSIS — R74.8 ABNORMAL LEVELS OF OTHER SERUM ENZYMES: ICD-10-CM

## 2025-09-16 LAB
25(OH)D3 SERPL-MCNC: 25.3 NG/ML
ALBUMIN SERPL ELPH-MCNC: 4.4 G/DL
ALP BLD-CCNC: 55 U/L
ALT SERPL-CCNC: 30 U/L
ANION GAP SERPL CALC-SCNC: 11 MMOL/L
AST SERPL-CCNC: 32 U/L
BASOPHILS # BLD AUTO: 0.02 K/UL
BASOPHILS NFR BLD AUTO: 0.4 %
BILIRUB SERPL-MCNC: <0.2 MG/DL
BUN SERPL-MCNC: 12 MG/DL
CALCIUM SERPL-MCNC: 9.2 MG/DL
CHLORIDE SERPL-SCNC: 101 MMOL/L
CHOLEST SERPL-MCNC: 196 MG/DL
CK SERPL-CCNC: 261 U/L
CO2 SERPL-SCNC: 24 MMOL/L
CREAT SERPL-MCNC: 0.84 MG/DL
EGFRCR SERPLBLD CKD-EPI 2021: 98 ML/MIN/1.73M2
EOSINOPHIL # BLD AUTO: 0.03 K/UL
EOSINOPHIL NFR BLD AUTO: 0.6 %
ESTIMATED AVERAGE GLUCOSE: 105 MG/DL
FERRITIN SERPL-MCNC: 14 NG/ML
FOLATE SERPL-MCNC: 10.1 NG/ML
GLUCOSE SERPL-MCNC: 88 MG/DL
HBA1C MFR BLD HPLC: 5.3 %
HCT VFR BLD CALC: 36.6 %
HDLC SERPL-MCNC: 85 MG/DL
HGB BLD-MCNC: 11.7 G/DL
IMM GRANULOCYTES NFR BLD AUTO: 0.2 %
IRON SATN MFR SERPL: 17 %
IRON SERPL-MCNC: 62 UG/DL
LDLC SERPL-MCNC: 99 MG/DL
LYMPHOCYTES # BLD AUTO: 2.54 K/UL
LYMPHOCYTES NFR BLD AUTO: 52.3 %
MAN DIFF?: NORMAL
MCHC RBC-ENTMCNC: 28.1 PG
MCHC RBC-ENTMCNC: 32 G/DL
MCV RBC AUTO: 87.8 FL
MONOCYTES # BLD AUTO: 0.43 K/UL
MONOCYTES NFR BLD AUTO: 8.8 %
NEUTROPHILS # BLD AUTO: 1.83 K/UL
NEUTROPHILS NFR BLD AUTO: 37.7 %
NONHDLC SERPL-MCNC: 112 MG/DL
PLATELET # BLD AUTO: 260 K/UL
POTASSIUM SERPL-SCNC: 4.1 MMOL/L
PROT SERPL-MCNC: 7.6 G/DL
RBC # BLD: 4.17 M/UL
RBC # FLD: 14.5 %
SODIUM SERPL-SCNC: 136 MMOL/L
T3FREE SERPL-MCNC: 2.16 PG/ML
T4 FREE SERPL-MCNC: 1 NG/DL
TIBC SERPL-MCNC: 366 UG/DL
TRIGL SERPL-MCNC: 71 MG/DL
TSH SERPL-ACNC: 0.88 UIU/ML
UIBC SERPL-MCNC: 305 UG/DL
VIT B12 SERPL-MCNC: 642 PG/ML
WBC # FLD AUTO: 4.86 K/UL

## 2025-09-19 ENCOUNTER — APPOINTMENT (OUTPATIENT)
Dept: OBGYN | Facility: CLINIC | Age: 25
End: 2025-09-19

## 2025-09-19 ENCOUNTER — NON-APPOINTMENT (OUTPATIENT)
Age: 25
End: 2025-09-19

## 2025-09-19 ENCOUNTER — RESULT REVIEW (OUTPATIENT)
Age: 25
End: 2025-09-19

## 2025-09-19 VITALS
BODY MASS INDEX: 27.47 KG/M2 | WEIGHT: 175 LBS | HEIGHT: 67 IN | SYSTOLIC BLOOD PRESSURE: 100 MMHG | DIASTOLIC BLOOD PRESSURE: 80 MMHG

## 2025-09-19 DIAGNOSIS — Z01.419 ENCOUNTER FOR GYNECOLOGICAL EXAMINATION (GENERAL) (ROUTINE) W/OUT ABNORMAL FINDINGS: ICD-10-CM

## 2025-09-19 DIAGNOSIS — N85.2 HYPERTROPHY OF UTERUS: ICD-10-CM

## 2025-09-19 DIAGNOSIS — R35.0 FREQUENCY OF MICTURITION: ICD-10-CM

## 2025-09-19 DIAGNOSIS — Z12.4 ENCOUNTER FOR SCREENING FOR MALIGNANT NEOPLASM OF CERVIX: ICD-10-CM

## 2025-09-19 LAB
APPEARANCE: CLEAR
BILIRUBIN URINE: NEGATIVE
BLOOD URINE: NEGATIVE
COLOR: YELLOW
GLUCOSE QUALITATIVE U: NEGATIVE
HEMOGLOBIN: 12.2
KETONES URINE: NEGATIVE
LEUKOCYTE ESTERASE URINE: NEGATIVE
NITRITE URINE: NEGATIVE
PH URINE: 7
PROTEIN URINE: NEGATIVE
SPECIFIC GRAVITY URINE: <=1.005
UROBILINOGEN URINE: 0.2 (ref 0.2–?)

## 2025-09-25 LAB — CYTOLOGY CVX/VAG DOC THIN PREP: NORMAL
